# Patient Record
Sex: FEMALE | URBAN - METROPOLITAN AREA
[De-identification: names, ages, dates, MRNs, and addresses within clinical notes are randomized per-mention and may not be internally consistent; named-entity substitution may affect disease eponyms.]

---

## 2021-08-12 ENCOUNTER — IMPORTED ENCOUNTER (OUTPATIENT)
Dept: URBAN - METROPOLITAN AREA CLINIC 31 | Facility: CLINIC | Age: 76
End: 2021-08-12

## 2021-08-12 PROBLEM — Z96.1: Noted: 2021-08-12

## 2021-08-12 PROBLEM — H53.2: Noted: 2021-08-12

## 2021-08-12 PROCEDURE — 99213 OFFICE O/P EST LOW 20 MIN: CPT

## 2021-08-12 PROCEDURE — 92015 DETERMINE REFRACTIVE STATE: CPT

## 2021-08-12 NOTE — PATIENT DISCUSSION
1.  [facility] - RESOLVEDETIOLOGY UNDETERMINEDMRI REPORT REVIEW  UNREVEALING2. Pseudophakia OU - IOLs stable. Monitor for changes in vision. 3. Return for an appointment in 12 months for comprehensive exam. with Dr. Linda Alanis.

## 2022-04-01 ASSESSMENT — VISUAL ACUITY
OD_CC: 20/40+2
OS_CC: 20/20-2

## 2022-07-09 ENCOUNTER — TELEPHONE ENCOUNTER (OUTPATIENT)
Dept: URBAN - METROPOLITAN AREA CLINIC 121 | Facility: CLINIC | Age: 77
End: 2022-07-09

## 2022-07-10 ENCOUNTER — TELEPHONE ENCOUNTER (OUTPATIENT)
Dept: URBAN - METROPOLITAN AREA CLINIC 121 | Facility: CLINIC | Age: 77
End: 2022-07-10

## 2022-07-10 RX ORDER — ARIPIPRAZOLE 2 MG/1
TABLET ORAL
Refills: 0 | Status: ACTIVE | COMMUNITY
Start: 2012-08-14

## 2022-07-10 RX ORDER — CALCIUM NO.38/D3/MAG/BORON ASP 500MG/15ML
LIQUID (ML) ORAL
Refills: 0 | Status: ACTIVE | COMMUNITY
Start: 2012-08-14

## 2022-07-10 RX ORDER — ALPRAZOLAM 0.25 MG
TABLET ORAL
Refills: 0 | Status: ACTIVE | COMMUNITY
Start: 2012-08-14

## 2022-07-10 RX ORDER — LOSARTAN POTASSIUM AND HYDROCHLOROTHIAZIDE 100; 25 MG/1; MG/1
TABLET, FILM COATED ORAL
Refills: 0 | Status: ACTIVE | COMMUNITY
Start: 2012-08-14

## 2022-07-10 RX ORDER — DESVENLAFAXINE SUCCINATE 100 MG/1
TABLET, EXTENDED RELEASE ORAL
Refills: 0 | Status: ACTIVE | COMMUNITY
Start: 2012-08-14